# Patient Record
Sex: FEMALE | Race: WHITE | Employment: FULL TIME | URBAN - METROPOLITAN AREA
[De-identification: names, ages, dates, MRNs, and addresses within clinical notes are randomized per-mention and may not be internally consistent; named-entity substitution may affect disease eponyms.]

---

## 2018-04-11 LAB
ANTIBODY SCREEN, EXTERNAL: NEGATIVE
HBSAG, EXTERNAL: NEGATIVE
HIV, EXTERNAL: NEGATIVE
RPR, EXTERNAL: NORMAL
RUBELLA, EXTERNAL: NORMAL
TYPE, ABO & RH, EXTERNAL: NORMAL

## 2018-10-26 LAB — GRBS, EXTERNAL: NEGATIVE

## 2018-11-15 ENCOUNTER — HOSPITAL ENCOUNTER (INPATIENT)
Age: 33
LOS: 4 days | Discharge: HOME | End: 2018-11-19
Attending: OBSTETRICS & GYNECOLOGY | Admitting: OBSTETRICS & GYNECOLOGY
Payer: COMMERCIAL

## 2018-11-15 PROBLEM — Z33.1 IUP (INTRAUTERINE PREGNANCY), INCIDENTAL: Status: ACTIVE | Noted: 2018-11-15

## 2018-11-15 PROBLEM — Z3A.38 38 WEEKS GESTATION OF PREGNANCY: Status: ACTIVE | Noted: 2018-11-15

## 2018-11-15 LAB
ABO + RH BLD: NORMAL
ALBUMIN SERPL-MCNC: 2.4 G/DL (ref 3.4–5)
ALBUMIN/GLOB SERPL: 0.5 {RATIO} (ref 0.8–1.7)
ALP SERPL-CCNC: 144 U/L (ref 45–117)
ALT SERPL-CCNC: 16 U/L (ref 13–56)
ANION GAP SERPL CALC-SCNC: 11 MMOL/L (ref 3–18)
AST SERPL-CCNC: 12 U/L (ref 15–37)
BASOPHILS # BLD: 0 K/UL (ref 0–0.1)
BASOPHILS NFR BLD: 0 % (ref 0–2)
BILIRUB SERPL-MCNC: 0.2 MG/DL (ref 0.2–1)
BLOOD GROUP ANTIBODIES SERPL: NORMAL
BUN SERPL-MCNC: 9 MG/DL (ref 7–18)
BUN/CREAT SERPL: 15
CALCIUM SERPL-MCNC: 8.6 MG/DL (ref 8.5–10.1)
CHLORIDE SERPL-SCNC: 105 MMOL/L (ref 100–108)
CO2 SERPL-SCNC: 21 MMOL/L (ref 21–32)
CREAT SERPL-MCNC: 0.62 MG/DL (ref 0.6–1.3)
DIFFERENTIAL METHOD BLD: ABNORMAL
EOSINOPHIL # BLD: 0.2 K/UL (ref 0–0.4)
EOSINOPHIL NFR BLD: 2 % (ref 0–5)
ERYTHROCYTE [DISTWIDTH] IN BLOOD BY AUTOMATED COUNT: 14.2 % (ref 11.6–14.5)
GLOBULIN SER CALC-MCNC: 4.4 G/DL (ref 2–4)
GLUCOSE SERPL-MCNC: 82 MG/DL (ref 74–99)
HCT VFR BLD AUTO: 36.7 % (ref 35–45)
HGB BLD-MCNC: 11.9 G/DL (ref 12–16)
LDH SERPL L TO P-CCNC: 143 U/L (ref 81–234)
LYMPHOCYTES # BLD: 1.2 K/UL (ref 0.9–3.6)
LYMPHOCYTES NFR BLD: 12 % (ref 21–52)
MCH RBC QN AUTO: 29.2 PG (ref 24–34)
MCHC RBC AUTO-ENTMCNC: 32.4 G/DL (ref 31–37)
MCV RBC AUTO: 90 FL (ref 74–97)
MONOCYTES # BLD: 0.5 K/UL (ref 0.05–1.2)
MONOCYTES NFR BLD: 5 % (ref 3–10)
NEUTS SEG # BLD: 8.8 K/UL (ref 1.8–8)
NEUTS SEG NFR BLD: 81 % (ref 40–73)
PLATELET # BLD AUTO: 261 K/UL (ref 135–420)
PMV BLD AUTO: 9.7 FL (ref 9.2–11.8)
POTASSIUM SERPL-SCNC: 4 MMOL/L (ref 3.5–5.5)
PROT SERPL-MCNC: 6.8 G/DL (ref 6.4–8.2)
RBC # BLD AUTO: 4.08 M/UL (ref 4.2–5.3)
SODIUM SERPL-SCNC: 137 MMOL/L (ref 136–145)
SPECIMEN EXP DATE BLD: NORMAL
URATE SERPL-MCNC: 4.7 MG/DL (ref 2.6–7.2)
WBC # BLD AUTO: 10.7 K/UL (ref 4.6–13.2)

## 2018-11-15 PROCEDURE — 86901 BLOOD TYPING SEROLOGIC RH(D): CPT

## 2018-11-15 PROCEDURE — 84550 ASSAY OF BLOOD/URIC ACID: CPT

## 2018-11-15 PROCEDURE — 74011250636 HC RX REV CODE- 250/636: Performed by: OBSTETRICS & GYNECOLOGY

## 2018-11-15 PROCEDURE — 85025 COMPLETE CBC W/AUTO DIFF WBC: CPT

## 2018-11-15 PROCEDURE — 74011250637 HC RX REV CODE- 250/637: Performed by: OBSTETRICS & GYNECOLOGY

## 2018-11-15 PROCEDURE — 83615 LACTATE (LD) (LDH) ENZYME: CPT

## 2018-11-15 PROCEDURE — 80053 COMPREHEN METABOLIC PANEL: CPT

## 2018-11-15 PROCEDURE — 75410000002 HC LABOR FEE PER 1 HR

## 2018-11-15 PROCEDURE — 65270000029 HC RM PRIVATE

## 2018-11-15 RX ORDER — OXYTOCIN/0.9 % SODIUM CHLORIDE 30/500 ML
2-20 PLASTIC BAG, INJECTION (ML) INTRAVENOUS
Status: DISCONTINUED | OUTPATIENT
Start: 2018-11-16 | End: 2018-11-19 | Stop reason: HOSPADM

## 2018-11-15 RX ORDER — LEVOTHYROXINE SODIUM 125 UG/1
125 TABLET ORAL
COMMUNITY

## 2018-11-15 RX ORDER — OXYTOCIN/RINGER'S LACTATE 20/1000 ML
999 PLASTIC BAG, INJECTION (ML) INTRAVENOUS ONCE
Status: ACTIVE | OUTPATIENT
Start: 2018-11-15 | End: 2018-11-16

## 2018-11-15 RX ORDER — OXYTOCIN/0.9 % SODIUM CHLORIDE 30/500 ML
0-25 PLASTIC BAG, INJECTION (ML) INTRAVENOUS
Status: DISCONTINUED | OUTPATIENT
Start: 2018-11-15 | End: 2018-11-19 | Stop reason: HOSPADM

## 2018-11-15 RX ORDER — BUTORPHANOL TARTRATE 1 MG/ML
2 INJECTION INTRAMUSCULAR; INTRAVENOUS
Status: DISCONTINUED | OUTPATIENT
Start: 2018-11-15 | End: 2018-11-16 | Stop reason: HOSPADM

## 2018-11-15 RX ORDER — METHYLERGONOVINE MALEATE 0.2 MG/ML
0.2 INJECTION INTRAVENOUS AS NEEDED
Status: DISCONTINUED | OUTPATIENT
Start: 2018-11-15 | End: 2018-11-16 | Stop reason: HOSPADM

## 2018-11-15 RX ORDER — HYDROMORPHONE HYDROCHLORIDE 2 MG/ML
1 INJECTION, SOLUTION INTRAMUSCULAR; INTRAVENOUS; SUBCUTANEOUS
Status: DISCONTINUED | OUTPATIENT
Start: 2018-11-15 | End: 2018-11-16 | Stop reason: HOSPADM

## 2018-11-15 RX ORDER — ACETAMINOPHEN 500 MG
1000 TABLET ORAL
Status: DISCONTINUED | OUTPATIENT
Start: 2018-11-15 | End: 2018-11-19 | Stop reason: HOSPADM

## 2018-11-15 RX ORDER — NALBUPHINE HYDROCHLORIDE 10 MG/ML
10 INJECTION, SOLUTION INTRAMUSCULAR; INTRAVENOUS; SUBCUTANEOUS
Status: DISCONTINUED | OUTPATIENT
Start: 2018-11-15 | End: 2018-11-16 | Stop reason: HOSPADM

## 2018-11-15 RX ORDER — LIDOCAINE HYDROCHLORIDE 10 MG/ML
20 INJECTION, SOLUTION EPIDURAL; INFILTRATION; INTRACAUDAL; PERINEURAL AS NEEDED
Status: DISCONTINUED | OUTPATIENT
Start: 2018-11-15 | End: 2018-11-16 | Stop reason: HOSPADM

## 2018-11-15 RX ORDER — MINERAL OIL
30 OIL (ML) ORAL AS NEEDED
Status: DISCONTINUED | OUTPATIENT
Start: 2018-11-15 | End: 2018-11-16 | Stop reason: HOSPADM

## 2018-11-15 RX ORDER — LANOLIN ALCOHOL/MO/W.PET/CERES
CREAM (GRAM) TOPICAL
COMMUNITY

## 2018-11-15 RX ORDER — OXYTOCIN/RINGER'S LACTATE 20/1000 ML
125 PLASTIC BAG, INJECTION (ML) INTRAVENOUS CONTINUOUS
Status: DISCONTINUED | OUTPATIENT
Start: 2018-11-15 | End: 2018-11-16 | Stop reason: HOSPADM

## 2018-11-15 RX ORDER — CHOLECALCIFEROL (VITAMIN D3) 125 MCG
CAPSULE ORAL
COMMUNITY

## 2018-11-15 RX ORDER — SODIUM CHLORIDE, SODIUM LACTATE, POTASSIUM CHLORIDE, CALCIUM CHLORIDE 600; 310; 30; 20 MG/100ML; MG/100ML; MG/100ML; MG/100ML
125 INJECTION, SOLUTION INTRAVENOUS CONTINUOUS
Status: DISCONTINUED | OUTPATIENT
Start: 2018-11-15 | End: 2018-11-16 | Stop reason: HOSPADM

## 2018-11-15 RX ORDER — TERBUTALINE SULFATE 1 MG/ML
0.25 INJECTION SUBCUTANEOUS
Status: DISCONTINUED | OUTPATIENT
Start: 2018-11-15 | End: 2018-11-16 | Stop reason: HOSPADM

## 2018-11-15 RX ADMIN — OXYTOCIN-SODIUM CHLORIDE 0.9% IV SOLN 30 UNIT/500ML 1 MILLI-UNITS/MIN: 30-0.9/5 SOLUTION at 21:55

## 2018-11-15 RX ADMIN — ACETAMINOPHEN 1000 MG: 500 TABLET ORAL at 22:17

## 2018-11-15 RX ADMIN — SODIUM CHLORIDE, SODIUM LACTATE, POTASSIUM CHLORIDE, AND CALCIUM CHLORIDE 125 ML/HR: 600; 310; 30; 20 INJECTION, SOLUTION INTRAVENOUS at 22:22

## 2018-11-15 RX ADMIN — SODIUM CHLORIDE, SODIUM LACTATE, POTASSIUM CHLORIDE, AND CALCIUM CHLORIDE 125 ML/HR: 600; 310; 30; 20 INJECTION, SOLUTION INTRAVENOUS at 14:45

## 2018-11-15 RX ADMIN — LEVOTHYROXINE SODIUM 125 MCG: 25 TABLET ORAL at 21:00

## 2018-11-15 NOTE — ROUTINE PROCESS
38+5 weeks gestation ambulated to unit from office for direct admission due to confirmed by CNM exam in office of SROM which patient reports occurred today at 1100. Patient taken to [facility] and oriented to room and call bell, provided with gown to change. Admission assessment completed, consent forms signed, admission questions completed. 1459:  SVE 1-2/70/-3. Patient provided with ice water per request, call bell within reach. 1615:  Dr. Jossie Morrow at bedside assessing patient, [address] labs, patient's blood pressures, and SVE by this reported to Dr. Jossie Morrow, Dr. Jossie Morrow discussed plan of care with patient. Plan of care to start low dose Pitocin over night, patient may eat light dinner, may begin increasing Pitocin over 6mUn/min starting tomorrow morning at 0500. Patient verbalized understanding and agreement. 1850:  Telephone report given to Dr. Ernie Daniel, including blood pressures, labs, history, PROM, mild contractions, order to start low dose Pitocin held due to decelerations, and requested Dr. Ernie Daniel reviewed strip. DR. Ernie Daniel reviewed strip. MD reviewed strip and order received to continue to hold Pitocin and hold patient's diet order for the next 1-2 hours and only allow patient to eat and start Pitocin if FHR tracing is reactive without decelerations during the next 1-2 hours. Order to collect POC urine dip stick. Plan of care discussed with patient and patient verbalized understanding and agreement. 1920:  SBAR shift change report given to KELLY Simmons RN (oncoming nurse) by Lizy Parisi RN 
 (offgoing nurse). Report included the following information SBAR, MAR and Recent Results. Alarm parameters reviewed and opportunities for questions provided.

## 2018-11-15 NOTE — H&P
Ostetrical History and Physical 
Subjective:  
 
Date of Admission: 11/15/2018 Patient is a 35 y.o.   female admitted with PROM at 45 w at 11am, with no labor. Gest diabetes by diet. Shadi Reed For Obstetric history, see tejas. 
 
For Review of Systems, see prenatal 
 
Past Medical History:  
Diagnosis Date  Abnormal Papanicolaou smear of cervix  Anemia  Asthma  Gestational diabetes   
 diet controlled No past surgical history on file. Prior to Admission medications Medication Sig Start Date End Date Taking? Authorizing Provider  
levothyroxine (SYNTHROID) 125 mcg tablet Take 125 mcg by mouth Daily (before breakfast). Yes Provider, Historical  
ferrous sulfate (IRON) 325 mg (65 mg iron) tablet Take  by mouth Daily (before breakfast). Yes Provider, Historical  
PNV66-Iron Fumarate-FA-DSS-DHA 26-1.2- mg cap Take  by mouth. Yes Provider, Historical  
cholecalciferol, vitamin D3, (VITAMIN D3) 2,000 unit tab Take  by mouth. Yes Provider, Historical  
 
Allergies Allergen Reactions  Pcn [Penicillins] Rash Social History Tobacco Use  Smoking status: Former Smoker  Smokeless tobacco: Never Used Substance Use Topics  Alcohol use: No  
  Frequency: Never No family history on file. Objective:  
 
Blood pressure (!) 145/94, pulse 91, temperature 97.5 °F (36.4 °C), resp. rate 18, height 5' 4\" (1.626 m), weight 93.4 kg (206 lb), not currently breastfeeding. Temp (24hrs), Av.5 °F (36.4 °C), Min:97.5 °F (36.4 °C), Max:97.5 °F (36.4 °C) No intake/output data recorded. No intake/output data recorded. HEENT: No pallor, no jaundice, Thyroid and throat normal 
RESPIRATORY: Clear to A & P 
CVS: pulse reg, HS normal 
ABDOMEN: Gravid. Vertex. EFW=8lb +-1lb. No abnormal tenderness. Pelvic: Cervix 2, Effaced: 70% Station:  -3 Data Review:  
Recent Results (from the past 24 hour(s)) CBC WITH AUTOMATED DIFF  
 Collection Time: 11/15/18  2:40 PM  
Result Value Ref Range WBC 10.7 4.6 - 13.2 K/uL  
 RBC 4.08 (L) 4.20 - 5.30 M/uL  
 HGB 11.9 (L) 12.0 - 16.0 g/dL HCT 36.7 35.0 - 45.0 % MCV 90.0 74.0 - 97.0 FL  
 MCH 29.2 24.0 - 34.0 PG  
 MCHC 32.4 31.0 - 37.0 g/dL  
 RDW 14.2 11.6 - 14.5 % PLATELET 870 367 - 797 K/uL MPV 9.7 9.2 - 11.8 FL  
 NEUTROPHILS 81 (H) 40 - 73 % LYMPHOCYTES 12 (L) 21 - 52 % MONOCYTES 5 3 - 10 % EOSINOPHILS 2 0 - 5 % BASOPHILS 0 0 - 2 %  
 ABS. NEUTROPHILS 8.8 (H) 1.8 - 8.0 K/UL  
 ABS. LYMPHOCYTES 1.2 0.9 - 3.6 K/UL  
 ABS. MONOCYTES 0.5 0.05 - 1.2 K/UL  
 ABS. EOSINOPHILS 0.2 0.0 - 0.4 K/UL  
 ABS. BASOPHILS 0.0 0.0 - 0.1 K/UL  
 DF AUTOMATED METABOLIC PANEL, COMPREHENSIVE Collection Time: 11/15/18  2:40 PM  
Result Value Ref Range Sodium 137 136 - 145 mmol/L Potassium 4.0 3.5 - 5.5 mmol/L Chloride 105 100 - 108 mmol/L  
 CO2 21 21 - 32 mmol/L Anion gap 11 3.0 - 18 mmol/L Glucose 82 74 - 99 mg/dL BUN 9 7.0 - 18 MG/DL Creatinine 0.62 0.6 - 1.3 MG/DL  
 BUN/Creatinine ratio 15 GFR est AA >60 >60 ml/min/1.73m2 GFR est non-AA >60 >60 ml/min/1.73m2 Calcium 8.6 8.5 - 10.1 MG/DL Bilirubin, total 0.2 0.2 - 1.0 MG/DL  
 ALT (SGPT) 16 13 - 56 U/L  
 AST (SGOT) 12 (L) 15 - 37 U/L Alk. phosphatase 144 (H) 45 - 117 U/L Protein, total 6.8 6.4 - 8.2 g/dL Albumin 2.4 (L) 3.4 - 5.0 g/dL Globulin 4.4 (H) 2.0 - 4.0 g/dL A-G Ratio 0.5 (L) 0.8 - 1.7 LD Collection Time: 11/15/18  2:40 PM  
Result Value Ref Range  81 - 234 U/L  
URIC ACID Collection Time: 11/15/18  2:40 PM  
Result Value Ref Range Uric acid 4.7 2.6 - 7.2 MG/DL Monitor:  Reactivity:present Variability:present Baseline:within normal limits Assessment:  
 
Active Problems: 
  IUP (intrauterine pregnancy), incidental (11/15/2018) 38 weeks gestation of pregnancy (11/15/2018) Premature rupture of membranes (PROM), onset of labor within 24 hours, antepartum, , third trimester (11/15/2018) Gestational diabetes mellitus (GDM) in childbirth, diet controlled (11/15/2018) Plan: Low dose pit tonight Check labs:  CBC Check  Prenatal: 
 
Disposition:  
 
Type of admit:In-Patient I saw and examined patient. Signed By: David Grullon MD  
                      November 15, 2018

## 2018-11-16 ENCOUNTER — ANESTHESIA (OUTPATIENT)
Dept: LABOR AND DELIVERY | Age: 33
End: 2018-11-16
Payer: COMMERCIAL

## 2018-11-16 ENCOUNTER — ANESTHESIA EVENT (OUTPATIENT)
Dept: LABOR AND DELIVERY | Age: 33
End: 2018-11-16
Payer: COMMERCIAL

## 2018-11-16 LAB — GLUCOSE BLD STRIP.AUTO-MCNC: 107 MG/DL (ref 70–110)

## 2018-11-16 PROCEDURE — 76060000078 HC EPIDURAL ANESTHESIA

## 2018-11-16 PROCEDURE — 77030032490 HC SLV COMPR SCD KNE COVD -B: Performed by: OBSTETRICS & GYNECOLOGY

## 2018-11-16 PROCEDURE — 77030032490 HC SLV COMPR SCD KNE COVD -B

## 2018-11-16 PROCEDURE — 65270000029 HC RM PRIVATE

## 2018-11-16 PROCEDURE — 77010026064 HC OXYGEN INFANT MED AIR MIN

## 2018-11-16 PROCEDURE — 74011250636 HC RX REV CODE- 250/636

## 2018-11-16 PROCEDURE — 88307 TISSUE EXAM BY PATHOLOGIST: CPT

## 2018-11-16 PROCEDURE — 74011250636 HC RX REV CODE- 250/636: Performed by: OBSTETRICS & GYNECOLOGY

## 2018-11-16 PROCEDURE — 82962 GLUCOSE BLOOD TEST: CPT

## 2018-11-16 PROCEDURE — 77030034849

## 2018-11-16 PROCEDURE — 75410000002 HC LABOR FEE PER 1 HR

## 2018-11-16 PROCEDURE — 77030010848 HC CATH INTUTR PRSS KOLB -B

## 2018-11-16 PROCEDURE — 76010000391 HC C SECN FIRST 1 HR: Performed by: OBSTETRICS & GYNECOLOGY

## 2018-11-16 PROCEDURE — 75410000003 HC RECOV DEL/VAG/CSECN EA 0.5 HR: Performed by: OBSTETRICS & GYNECOLOGY

## 2018-11-16 PROCEDURE — 77030007879 HC KT SPN EPDRL TELE -B: Performed by: ANESTHESIOLOGY

## 2018-11-16 PROCEDURE — 77030008462 HC STPLR SKN PROX J&J -A: Performed by: OBSTETRICS & GYNECOLOGY

## 2018-11-16 PROCEDURE — 81003 URINALYSIS AUTO W/O SCOPE: CPT

## 2018-11-16 PROCEDURE — 77030031139 HC SUT VCRL2 J&J -A: Performed by: OBSTETRICS & GYNECOLOGY

## 2018-11-16 PROCEDURE — 76060000032 HC ANESTHESIA 0.5 TO 1 HR: Performed by: OBSTETRICS & GYNECOLOGY

## 2018-11-16 PROCEDURE — 75410000003 HC RECOV DEL/VAG/CSECN EA 0.5 HR

## 2018-11-16 PROCEDURE — 74011250637 HC RX REV CODE- 250/637: Performed by: OBSTETRICS & GYNECOLOGY

## 2018-11-16 PROCEDURE — 77030011943

## 2018-11-16 PROCEDURE — 74011250636 HC RX REV CODE- 250/636: Performed by: ANESTHESIOLOGY

## 2018-11-16 RX ORDER — CEFAZOLIN SODIUM/WATER 2 G/20 ML
SYRINGE (ML) INTRAVENOUS
Status: DISPENSED
Start: 2018-11-16 | End: 2018-11-17

## 2018-11-16 RX ORDER — SODIUM CHLORIDE 0.9 % (FLUSH) 0.9 %
5-10 SYRINGE (ML) INJECTION AS NEEDED
Status: DISCONTINUED | OUTPATIENT
Start: 2018-11-16 | End: 2018-11-19 | Stop reason: HOSPADM

## 2018-11-16 RX ORDER — CEFOXITIN SODIUM 2 G/50ML
2 INJECTION, SOLUTION INTRAVENOUS
Status: COMPLETED | OUTPATIENT
Start: 2018-11-16 | End: 2018-11-16

## 2018-11-16 RX ORDER — SODIUM CHLORIDE 0.9 % (FLUSH) 0.9 %
5-10 SYRINGE (ML) INJECTION EVERY 8 HOURS
Status: DISCONTINUED | OUTPATIENT
Start: 2018-11-16 | End: 2018-11-18

## 2018-11-16 RX ORDER — PHENYLEPHRINE HCL IN 0.9% NACL 1 MG/10 ML
80 SYRINGE (ML) INTRAVENOUS AS NEEDED
Status: DISCONTINUED | OUTPATIENT
Start: 2018-11-16 | End: 2018-11-16 | Stop reason: HOSPADM

## 2018-11-16 RX ORDER — FENTANYL CITRATE 50 UG/ML
100 INJECTION, SOLUTION INTRAMUSCULAR; INTRAVENOUS ONCE
Status: DISCONTINUED | OUTPATIENT
Start: 2018-11-16 | End: 2018-11-16 | Stop reason: HOSPADM

## 2018-11-16 RX ORDER — BUPIVACAINE HYDROCHLORIDE 2.5 MG/ML
INJECTION, SOLUTION EPIDURAL; INFILTRATION; INTRACAUDAL AS NEEDED
Status: DISCONTINUED | OUTPATIENT
Start: 2018-11-16 | End: 2018-11-16 | Stop reason: HOSPADM

## 2018-11-16 RX ORDER — LIDOCAINE HYDROCHLORIDE 10 MG/ML
INJECTION INFILTRATION; PERINEURAL AS NEEDED
Status: DISCONTINUED | OUTPATIENT
Start: 2018-11-16 | End: 2018-11-16 | Stop reason: HOSPADM

## 2018-11-16 RX ORDER — SODIUM CHLORIDE 0.9 % (FLUSH) 0.9 %
5-10 SYRINGE (ML) INJECTION AS NEEDED
Status: DISCONTINUED | OUTPATIENT
Start: 2018-11-16 | End: 2018-11-16 | Stop reason: HOSPADM

## 2018-11-16 RX ORDER — FENTANYL/ROPIVACAINE/NS/PF 2MCG/ML-.1
PLASTIC BAG, INJECTION (ML) EPIDURAL
Status: COMPLETED
Start: 2018-11-16 | End: 2018-11-16

## 2018-11-16 RX ORDER — LIDOCAINE HYDROCHLORIDE AND EPINEPHRINE 15; 5 MG/ML; UG/ML
INJECTION, SOLUTION EPIDURAL AS NEEDED
Status: DISCONTINUED | OUTPATIENT
Start: 2018-11-16 | End: 2018-11-16 | Stop reason: HOSPADM

## 2018-11-16 RX ORDER — SODIUM CHLORIDE, SODIUM LACTATE, POTASSIUM CHLORIDE, CALCIUM CHLORIDE 600; 310; 30; 20 MG/100ML; MG/100ML; MG/100ML; MG/100ML
125 INJECTION, SOLUTION INTRAVENOUS CONTINUOUS
Status: DISPENSED | OUTPATIENT
Start: 2018-11-16 | End: 2018-11-17

## 2018-11-16 RX ORDER — PROMETHAZINE HYDROCHLORIDE 25 MG/ML
25 INJECTION, SOLUTION INTRAMUSCULAR; INTRAVENOUS
Status: DISCONTINUED | OUTPATIENT
Start: 2018-11-16 | End: 2018-11-19 | Stop reason: HOSPADM

## 2018-11-16 RX ORDER — OXYCODONE AND ACETAMINOPHEN 5; 325 MG/1; MG/1
1-2 TABLET ORAL
Status: DISCONTINUED | OUTPATIENT
Start: 2018-11-16 | End: 2018-11-19 | Stop reason: HOSPADM

## 2018-11-16 RX ORDER — FENTANYL CITRATE 50 UG/ML
INJECTION, SOLUTION INTRAMUSCULAR; INTRAVENOUS AS NEEDED
Status: DISCONTINUED | OUTPATIENT
Start: 2018-11-16 | End: 2018-11-16 | Stop reason: HOSPADM

## 2018-11-16 RX ORDER — IBUPROFEN 400 MG/1
800 TABLET ORAL
Status: DISCONTINUED | OUTPATIENT
Start: 2018-11-19 | End: 2018-11-19 | Stop reason: HOSPADM

## 2018-11-16 RX ORDER — OXYTOCIN/RINGER'S LACTATE 20/1000 ML
125 PLASTIC BAG, INJECTION (ML) INTRAVENOUS CONTINUOUS
Status: DISCONTINUED | OUTPATIENT
Start: 2018-11-16 | End: 2018-11-19 | Stop reason: HOSPADM

## 2018-11-16 RX ORDER — NALOXONE HYDROCHLORIDE 0.4 MG/ML
0.2 INJECTION, SOLUTION INTRAMUSCULAR; INTRAVENOUS; SUBCUTANEOUS AS NEEDED
Status: DISCONTINUED | OUTPATIENT
Start: 2018-11-16 | End: 2018-11-16 | Stop reason: HOSPADM

## 2018-11-16 RX ORDER — ACETAMINOPHEN 325 MG/1
650 TABLET ORAL
Status: DISCONTINUED | OUTPATIENT
Start: 2018-11-16 | End: 2018-11-19 | Stop reason: HOSPADM

## 2018-11-16 RX ORDER — CARBOPROST TROMETHAMINE 250 UG/ML
INJECTION, SOLUTION INTRAMUSCULAR
Status: DISPENSED
Start: 2018-11-16 | End: 2018-11-17

## 2018-11-16 RX ORDER — KETOROLAC TROMETHAMINE 30 MG/ML
30 INJECTION, SOLUTION INTRAMUSCULAR; INTRAVENOUS EVERY 6 HOURS
Status: DISPENSED | OUTPATIENT
Start: 2018-11-16 | End: 2018-11-18

## 2018-11-16 RX ORDER — KETOROLAC TROMETHAMINE 30 MG/ML
30 INJECTION, SOLUTION INTRAMUSCULAR; INTRAVENOUS EVERY 6 HOURS
Status: DISCONTINUED | OUTPATIENT
Start: 2018-11-16 | End: 2018-11-16

## 2018-11-16 RX ORDER — SIMETHICONE 80 MG
80 TABLET,CHEWABLE ORAL
Status: DISCONTINUED | OUTPATIENT
Start: 2018-11-16 | End: 2018-11-19 | Stop reason: HOSPADM

## 2018-11-16 RX ORDER — ONDANSETRON 2 MG/ML
INJECTION INTRAMUSCULAR; INTRAVENOUS AS NEEDED
Status: DISCONTINUED | OUTPATIENT
Start: 2018-11-16 | End: 2018-11-16 | Stop reason: HOSPADM

## 2018-11-16 RX ORDER — FENTANYL/ROPIVACAINE/NS/PF 2MCG/ML-.1
1-15 PLASTIC BAG, INJECTION (ML) EPIDURAL
Status: DISCONTINUED | OUTPATIENT
Start: 2018-11-16 | End: 2018-11-16 | Stop reason: HOSPADM

## 2018-11-16 RX ORDER — FENTANYL CITRATE 50 UG/ML
INJECTION, SOLUTION INTRAMUSCULAR; INTRAVENOUS
Status: COMPLETED
Start: 2018-11-16 | End: 2018-11-16

## 2018-11-16 RX ORDER — ZOLPIDEM TARTRATE 5 MG/1
5 TABLET ORAL
Status: DISCONTINUED | OUTPATIENT
Start: 2018-11-16 | End: 2018-11-19 | Stop reason: HOSPADM

## 2018-11-16 RX ORDER — FACIAL-BODY WIPES
10 EACH TOPICAL DAILY PRN
Status: DISCONTINUED | OUTPATIENT
Start: 2018-11-16 | End: 2018-11-19 | Stop reason: HOSPADM

## 2018-11-16 RX ORDER — MISOPROSTOL 100 UG/1
TABLET ORAL
Status: DISPENSED
Start: 2018-11-16 | End: 2018-11-17

## 2018-11-16 RX ORDER — OXYTOCIN/RINGER'S LACTATE 20/1000 ML
PLASTIC BAG, INJECTION (ML) INTRAVENOUS
Status: DISCONTINUED | OUTPATIENT
Start: 2018-11-16 | End: 2018-11-16 | Stop reason: HOSPADM

## 2018-11-16 RX ORDER — KETOROLAC TROMETHAMINE 30 MG/ML
INJECTION, SOLUTION INTRAMUSCULAR; INTRAVENOUS
Status: COMPLETED
Start: 2018-11-16 | End: 2018-11-16

## 2018-11-16 RX ORDER — SODIUM CHLORIDE 0.9 % (FLUSH) 0.9 %
5-10 SYRINGE (ML) INJECTION EVERY 8 HOURS
Status: DISCONTINUED | OUTPATIENT
Start: 2018-11-16 | End: 2018-11-16 | Stop reason: HOSPADM

## 2018-11-16 RX ADMIN — Medication: at 13:48

## 2018-11-16 RX ADMIN — SODIUM CHLORIDE, SODIUM LACTATE, POTASSIUM CHLORIDE, AND CALCIUM CHLORIDE 125 ML/HR: 600; 310; 30; 20 INJECTION, SOLUTION INTRAVENOUS at 23:47

## 2018-11-16 RX ADMIN — OXYCODONE HYDROCHLORIDE AND ACETAMINOPHEN 2 TABLET: 5; 325 TABLET ORAL at 23:47

## 2018-11-16 RX ADMIN — ONDANSETRON 4 MG: 2 INJECTION INTRAMUSCULAR; INTRAVENOUS at 14:03

## 2018-11-16 RX ADMIN — BUPIVACAINE HYDROCHLORIDE 8 ML: 2.5 INJECTION, SOLUTION EPIDURAL; INFILTRATION; INTRACAUDAL at 09:11

## 2018-11-16 RX ADMIN — FENTANYL CITRATE 100 MCG: 50 INJECTION, SOLUTION INTRAMUSCULAR; INTRAVENOUS at 09:15

## 2018-11-16 RX ADMIN — SODIUM CHLORIDE, SODIUM LACTATE, POTASSIUM CHLORIDE, AND CALCIUM CHLORIDE 125 ML/HR: 600; 310; 30; 20 INJECTION, SOLUTION INTRAVENOUS at 13:06

## 2018-11-16 RX ADMIN — ROPIVACAINE HYDROCHLORIDE 10 ML/HR: 10 INJECTION, SOLUTION EPIDURAL at 09:16

## 2018-11-16 RX ADMIN — LEVOTHYROXINE SODIUM 125 MCG: 25 TABLET ORAL at 22:28

## 2018-11-16 RX ADMIN — OXYTOCIN-SODIUM CHLORIDE 0.9% IV SOLN 30 UNIT/500ML 4 MILLI-UNITS/MIN: 30-0.9/5 SOLUTION at 13:19

## 2018-11-16 RX ADMIN — CEFOXITIN SODIUM 2 G: 2 INJECTION, SOLUTION INTRAVENOUS at 09:56

## 2018-11-16 RX ADMIN — SODIUM CHLORIDE, SODIUM LACTATE, POTASSIUM CHLORIDE, AND CALCIUM CHLORIDE 1000 ML: 600; 310; 30; 20 INJECTION, SOLUTION INTRAVENOUS at 09:00

## 2018-11-16 RX ADMIN — KETOROLAC TROMETHAMINE 30 MG: 30 INJECTION, SOLUTION INTRAMUSCULAR at 16:33

## 2018-11-16 RX ADMIN — Medication 125 ML/HR: at 16:40

## 2018-11-16 RX ADMIN — LIDOCAINE HYDROCHLORIDE AND EPINEPHRINE 3 ML: 15; 5 INJECTION, SOLUTION EPIDURAL at 09:14

## 2018-11-16 RX ADMIN — LIDOCAINE HYDROCHLORIDE 3 ML: 10 INJECTION INFILTRATION; PERINEURAL at 09:09

## 2018-11-16 RX ADMIN — Medication 10 ML/HR: at 09:16

## 2018-11-16 RX ADMIN — ACETAMINOPHEN 650 MG: 325 TABLET ORAL at 18:58

## 2018-11-16 RX ADMIN — CEFOXITIN SODIUM 2 G: 2 INJECTION, SOLUTION INTRAVENOUS at 13:39

## 2018-11-16 NOTE — ANESTHESIA POSTPROCEDURE EVALUATION
Post-Anesthesia Evaluation and Assessment Cardiovascular Function/Vital Signs Visit Vitals /62 Pulse 99 Temp 36.9 °C (98.4 °F) Resp 16 Ht 5' 4\" (1.626 m) Wt 93.4 kg (206 lb) SpO2 100% Breastfeeding? Unknown BMI 35.36 kg/m² Patient is status post Procedure(s):  SECTION. Nausea/Vomiting: Controlled. Postoperative hydration reviewed and adequate. Pain: 
Pain Scale 1: Numeric (0 - 10) (18 1515) Pain Intensity 1: 0 (18 151) Managed. Neurological Status:  
Neuro (WDL): Within Defined Limits (18 1458) At baseline. Mental Status and Level of Consciousness: Arousable. Pulmonary Status:  
O2 Device: Room air (18 1458) Adequate oxygenation and airway patent. Complications related to anesthesia: None Post-anesthesia assessment completed. No concerns. Patient has met all discharge requirements. Signed By: Coco Macias CRNA 2018 Procedure(s):  SECTION. Anesthesia Post Evaluation Comments: Post-Anesthesia Evaluation and Assessment Cardiovascular Function/Vital Signs /45 (BP 1 Location: Right arm, BP Patient Position: At rest)   Pulse 97   Temp 37.6 °C (99.7 °F)   Resp 16   Ht 5' 4\" (1.626 m)   Wt 93.4 kg (206 lb)   SpO2 98%   Breastfeeding? Unknown   BMI 35.36 kg/m² Patient is status post Procedure(s):  SECTION. Nausea/Vomiting: Controlled. Postoperative hydration reviewed and adequate. Pain: 
Pain Scale 1: Numeric (0 - 10) (18 1700) Pain Intensity 1: 4 (18 1700) Managed. Neurological Status:  
Neuro (WDL): Within Defined Limits (18 1458) At baseline. Mental Status and Level of Consciousness: Arousable. Pulmonary Status:  
O2 Device: Room air (18 1700) Adequate oxygenation and airway patent. Complications related to anesthesia: None Post-anesthesia assessment completed. No concerns. Patient has met all discharge requirements. Signed By: Fatuma Lane MD  
 November 16, 2018 Visit Vitals /62 Pulse 99 Temp 36.9 °C (98.4 °F) Resp 16 Ht 5' 4\" (1.626 m) Wt 93.4 kg (206 lb) SpO2 100% Breastfeeding? Unknown BMI 35.36 kg/m²

## 2018-11-16 NOTE — PROGRESS NOTES
FHTs better after prolonged episode knee chest. Cervix same, lip only. N ow no cervical change for 2.5hours. Will try restart pit once more very slowly.  
 
Dt.

## 2018-11-16 NOTE — PROGRESS NOTES
1920:  Assumed care of pt from hospitals. Pt in room with family. Pt having decels at this time, Dr Anny Sosa and pt expressed understanding not to have anything to eat tat this time. Plan of Care discussed and Questions answered 2138:  SVE 1/70/-3. Will start pit at 1 
 
2157: Pt started at 1. Pt c/o headache  
 
2204:  Jarome May Paged. Order to keep pitocin at 1 Ok to give tylenol 1000 Q8 PRN  
 
0408:  Carlos Zamora noted. Pt turned to left side, bolus started 2775:  Decel noted. Bolus started pt turned to right side 9799:  Prolonged decels noted. Pit turned off

## 2018-11-16 NOTE — PROGRESS NOTES
Bedside and Verbal shift change report given to CARRIE Yo RN (oncoming nurse) by MIKE Sawyer RN (offgoing nurse). Report included the following information SBAR, Kardex, Procedure Summary, Intake/Output, MAR and Recent Results. 0815 5 minute deceleration noted with great variability. Dr. Maine Renae called at work and informed him of Pitocin at 6 lisandro-units, 5 minute decelerations. Orders received to go back to 4 lisandro-units. Encourage pt to get epidural for pain rather than stadol. Pitocin decreased to 4 lisandro-units. 7696 Dr. Feliciano Maier at bedside for epidural placement. Procedure explained by Dr. Feliciano Maier. Consent signed  
 
8242 Time Out completed 
 
0911 Bolus administered 
 
0913 Epidural Cath placed and procedure complete, pt tolerated procedure well. 
0913 Test dose administered by Anesthesiologist  
  
0914 Fentanyl handed to Dr. Feliciano Maier  and PCEA started, settings read to Dr. Feliciano Maier, and pain management explained to patient and family. Pt in semi-joy's position 0930 Fetal HR decreased to 90. Pt turned to right lateral side. Fluid bolus administered. PT assisted to left lateral side. Infant continues to be down. SVE 7-8/90/-2 
 
0936 Trial of hands and knees. Fi 
0937 HR going back up 0939 HR going down difficulty tracing HR. Pt returned to semi joy's position. FSE placed. Nurse instructed to call Dr. Maine Renae to make him aware. 9038 Test dose administered by Anesthesiologist  
 
0914 Fentanyl handed to Dr. Feliciano Maier  and PCEA started, settings read to Dr. Feliciano Maier, and pain management explained to patient and family. Pt in semi-joy's position 0930 Decreased FHR. Pt turned to left right lateral side. 6510 Fluid bolus administered 2431 PT assisted to right lateral side. 0934 O2 administered and Pitocin turned off.   
 
0936 SVE 7-8/90/-2 Scalp stimulation performed. FHR returned to 160 but decreasing when scalp stimulation stopped. Pt assisted to hands and knees. Trial of FSE placement but difficult in this position. RN  instructed to call Dr. Yolanda Sheppard. 1907 Difficulty tracing HR. PT returned in semifowler's position. FSE placed. 0947 FHR back to baseline. Dr. Yolanda Sheppard ordered start of Mefoxin. 56 Dr. Yolanda Sheppard at bedside. SVE 9/100/-1. Continue with plan for vaginal delivery 1050 Dr. Yolanda Sheppard at bedside. SVE unchanged. IUPC placed. ORders received to start amnioinfusion. 1115 Amnioinfusion started [address] [facility] [address]. Yolanda Sheppard on unit. FHR strip reviewed. Orders received to restart Pitocin. [address] Dr. Yolanda Sheppard at bedside. Decreased FHR. Prolonged deceleration. Pt turned on left lateral side, fluid bolus started. Pitocin stopped. 1220 PT back on right lateral side. SVE unchanged. Deceleration continued. 1224 PT in hands and knees. FHR back to baseline. Will monitor pt for 30 minutes in this position if no further change Dr. Arias Learn informed pt will do a . 1250 DR. Yolanda Sheppard at bedside. Pt returned on right lateral side. SVE unchanged but FHR stable. Pitocin restarted. 1320 Dr. Yolanda Sheppard at bedside. Orders received to go to 4 lisandro-units on the Pitocin. Immediate prolonged deceleration 1325 C-section called. 1328 In OR 
 
1348 Delivery of viable male infant. 1420 Return to [facility] room. [phone] paged for sign out] 1533 Sign out received. [address] and clean pads provided. 1710 TRANSFER - OUT REPORT: 
 
Verbal report given to VANESA Cassidy RN(name) on   being transferred to postpartum(unit) for routine progression of care Report consisted of patients Situation, Background, Assessment and  
Recommendations(SBAR). Information from the following report(s) SBAR, Kardex, Procedure Summary, Intake/Output, MAR and Recent Results was reviewed with the receiving nurse. Opportunity for questions and clarification was provided. Patient transported with: 
 Registered Nurse

## 2018-11-16 NOTE — PROGRESS NOTES
8130 Phone call to Dr. Justina Calle. Updated on patient status, FHT, contraction pattern and SVE. Verbal orders to give 2g of Mefoxin, turn off pitocin, and keep patient on side.

## 2018-11-16 NOTE — PROGRESS NOTES
2 decels overnight, 1 at 230 and 1 at 410. Otherwise FHTs have been excellent, with great variability and great reactivity. Pit turned off by Dr Se Kamara, restarted now. Monitor:  Reactivity:present Variability:present Baseline:within normal limits  Contractions q 5 Vitals:  Blood pressure 127/76, pulse (!) 113, temperature 97.8 °F (36.6 °C), resp. rate 18, height 5' 4\" (1.626 m), weight 93.4 kg (206 lb), not currently breastfeeding. Pelvic exam:  Cervix 3 Effaced: 50%Station: -2 Plan:  
 
Restart pit. Signed By: Ronda Castaneda MD  
                      November 16, 2018

## 2018-11-16 NOTE — ROUTINE PROCESS
Bedside and Verbal shift change report given to [address], RN (oncoming nurse) by Ambrose Reich RN (offgoing nurse). Report included the following information SBAR, Kardex, Intake/Output, MAR, Recent Results.

## 2018-11-16 NOTE — ANESTHESIA PREPROCEDURE EVALUATION
Anesthetic History No history of anesthetic complications Review of Systems / Medical History Patient summary reviewed, nursing notes reviewed and pertinent labs reviewed Pulmonary Asthma : well controlled Neuro/Psych Within defined limits Cardiovascular Within defined limits Exercise tolerance: >4 METS 
  
GI/Hepatic/Renal 
  
 
 
 
 
 
 Endo/Other Diabetes (gestational) Other Findings Physical Exam 
 
Airway Mallampati: II 
TM Distance: 4 - 6 cm Neck ROM: normal range of motion Mouth opening: Normal 
 
 Cardiovascular Dental 
 
Dentition: Loose teeth and Poor dentition Pulmonary Abdominal 
GI exam deferred Other Findings Anesthetic Plan ASA: 2 Anesthesia type: epidural 
 
 
 
 
 
Anesthetic plan and risks discussed with: Patient

## 2018-11-16 NOTE — PROGRESS NOTES
Patient seen Recurrent decels, always with good recovery, and good FHTs in between. Abx almost in Cervix lip on R and anterior Continue current treatment. Labor is adequate without pit Considered IUPC and amnio infusion, but will hold as she is lip only.  
 
dt

## 2018-11-16 NOTE — ANESTHESIA PROCEDURE NOTES
Epidural Block Start time: 11/16/2018 9:08 AM 
End time: 11/16/2018 9:18 AM 
Performed by: Tamy Wolfe MD 
Authorized by: Tamy Wolfe MD  
 
Pre-Procedure Indication: labor epidural   
Preanesthetic Checklist: patient identified, risks and benefits discussed, anesthesia consent, patient being monitored, timeout performed and anesthesia consent Epidural:  
Patient position:  Seated Prep region:  Lumbar Prep: Chlorhexidine Location:  L3-4 Needle and Epidural Catheter:  
Needle Type:  Tuohy Needle Gauge:  17 G Injection Technique:  Loss of resistance using air Attempts:  1 Catheter Size:  20 G Catheter at Skin Depth (cm):  11 Events: no blood with aspiration, no cerebrospinal fluid with aspiration, no paresthesia and negative aspiration test   
Test Dose:  Negative and lidocaine 1.5% w/ epi Assessment:  
Catheter Secured:  Tegaderm and tape Insertion:  Uncomplicated Patient tolerance:  Patient tolerated the procedure well with no immediate complications

## 2018-11-16 NOTE — PROGRESS NOTES
1700 TRANSFER - IN REPORT: 
 
Verbal report received from CARRIE Henson RN(name) on April Eastwood  being received from L&D(unit) for routine progression of care Report consisted of patients Situation, Background, Assessment and  
Recommendations(SBAR). Information from the following report(s) SBAR, Intake/Output, MAR and Recent Results was reviewed with the receiving nurse. Opportunity for questions and clarification was provided. Assessment completed upon patients arrival to unit and care assumed. Call bell placed within reach of patient. SCD's applied. IS explained and patient demonstrated. No further needs at this time 1800 pt in bed resting quietly, waiting on clear liquid tray. Denies any needs at this time. 1900 Bedside and Verbal shift change report given to [address] [facility] Miladis (oncoming nurse) by Ilya Coleman RN 
 (offgoing nurse). Report included the following information SBAR, Intake/Output, MAR and Recent Results.

## 2018-11-17 PROBLEM — O42.10 PROLONGED RUPTURE OF MEMBRANES, GREATER THAN 24 HOURS, DELIVERED, CURRENT HOSPITALIZATION: Status: ACTIVE | Noted: 2018-11-17

## 2018-11-17 PROBLEM — O36.8390 NON-REASSURING FETAL HEART RATE OR RHYTHM AFFECTING MANAGEMENT OF MOTHER: Status: ACTIVE | Noted: 2018-11-17

## 2018-11-17 LAB
HCT VFR BLD AUTO: 26.7 % (ref 35–45)
HGB BLD-MCNC: 8.8 G/DL (ref 12–16)

## 2018-11-17 PROCEDURE — 85014 HEMATOCRIT: CPT

## 2018-11-17 PROCEDURE — 36415 COLL VENOUS BLD VENIPUNCTURE: CPT

## 2018-11-17 PROCEDURE — 65270000029 HC RM PRIVATE

## 2018-11-17 PROCEDURE — 77030027138 HC INCENT SPIROMETER -A

## 2018-11-17 PROCEDURE — 74011250637 HC RX REV CODE- 250/637: Performed by: OBSTETRICS & GYNECOLOGY

## 2018-11-17 PROCEDURE — 74011250636 HC RX REV CODE- 250/636: Performed by: OBSTETRICS & GYNECOLOGY

## 2018-11-17 PROCEDURE — 85018 HEMOGLOBIN: CPT

## 2018-11-17 RX ORDER — LEVOTHYROXINE SODIUM 125 UG/1
125 TABLET ORAL
Status: DISCONTINUED | OUTPATIENT
Start: 2018-11-17 | End: 2018-11-19 | Stop reason: HOSPADM

## 2018-11-17 RX ADMIN — SODIUM CHLORIDE, SODIUM LACTATE, POTASSIUM CHLORIDE, AND CALCIUM CHLORIDE 125 ML/HR: 600; 310; 30; 20 INJECTION, SOLUTION INTRAVENOUS at 07:21

## 2018-11-17 RX ADMIN — KETOROLAC TROMETHAMINE 30 MG: 30 INJECTION, SOLUTION INTRAMUSCULAR at 04:34

## 2018-11-17 RX ADMIN — OXYCODONE HYDROCHLORIDE AND ACETAMINOPHEN 2 TABLET: 5; 325 TABLET ORAL at 09:39

## 2018-11-17 RX ADMIN — KETOROLAC TROMETHAMINE 30 MG: 30 INJECTION, SOLUTION INTRAMUSCULAR at 16:22

## 2018-11-17 RX ADMIN — LEVOTHYROXINE SODIUM 125 MCG: 125 TABLET ORAL at 22:32

## 2018-11-17 RX ADMIN — KETOROLAC TROMETHAMINE 30 MG: 30 INJECTION, SOLUTION INTRAMUSCULAR at 22:32

## 2018-11-17 RX ADMIN — OXYCODONE HYDROCHLORIDE AND ACETAMINOPHEN 2 TABLET: 5; 325 TABLET ORAL at 16:22

## 2018-11-17 RX ADMIN — KETOROLAC TROMETHAMINE 30 MG: 30 INJECTION, SOLUTION INTRAMUSCULAR at 10:39

## 2018-11-17 NOTE — PROGRESS NOTES
Problem: Falls - Risk of 
Goal: *Absence of Falls Document Mana Trejo Fall Risk and appropriate interventions in the flowsheet. Outcome: Resolved/Met Date Met: 11/17/18 Fall Risk Interventions: 
Mobility Interventions: Patient to call before getting OOB, Communicate number of staff needed for ambulation/transfer

## 2018-11-17 NOTE — PROGRESS NOTES
0700 Bedside and Verbal shift change report given to Guillermina Bunn RN 
 (oncoming nurse) by Doni Soriano RN (offgoing nurse). Report included the following information SBAR, Intake/Output, MAR and Recent Results. 0820 pt assessment completed (see flowsheet) Pt stated she was able to get \"a few hours of sleep overnight\". 0930 pt ambulated to bathroom w/out any dizziness or lightheadness, voided 500 ml pale yellow. Pt returned to bed, sitting on edge eating breakfast tray. Requesting pain meds. 0939 pain meds given (see MAR) 1730 fundal check completed. Pt taking a shower, removing dressing from incision. Reassessed incision site after removing dressing, approximated with no drainage 
 
1900 Bedside and Verbal shift change report given to JACK Jacobs RN (oncoming nurse) by Guillermina Bunn RN 
 (offgoing nurse). Report included the following information SBAR, Intake/Output, MAR and Recent Results.

## 2018-11-17 NOTE — PROGRESS NOTES
Progress Note Patient: Amilcar Monk MRN: [id number]  SSN: [id number] YOB: 1985  Age: 35 y.o. Sex: female ROOM:  240/01 Subjective:  
 
Postpartum Day: 1 Delivery:  delivery The patient feels well. The patient denies emotional concerns. The baby is well. Baby is feeding via breast.  The patient is ambulating well. The patient  tolerating a normal diet. Flatus has been passed. Objective:  
  
Patient Vitals for the past 24 hrs: 
 BP Temp Pulse Resp SpO2  
18 0755 112/67 98.1 °F (36.7 °C) 92 16 99 % 18 2330 124/73 98.4 °F (36.9 °C) 90 18 99 % 18 1930 118/70 98.2 °F (36.8 °C) 89 16 96 % 18 1700 118/45 99.7 °F (37.6 °C) 97 16 98 % 18 1630 111/70 98.4 °F (36.9 °C) 97 16   
18 1616 107/53  98    
18 1600 116/55  91  100 % 18 1555     100 % 18 1525     100 % 18 1520     100 % 18 1515 100/62 98.4 °F (36.9 °C) 99 16 100 % 18 1500 96/79  (!) 110    
18 1455  98.2 °F (36.8 °C) (!) 104  100 % 18 1450     100 % 18 1445 101/65  99  99 % 18 1442 92/58  (!) 111    
18 1440     99 % 18 1436 92/58 97.9 °F (36.6 °C) (!) 111 16   
18 1435     99 % 18 1433 104/58  (!) 105    
18 1430  97.9 °F (36.6 °C)   99 % 18 1428 (!) 82/60 97.9 °F (36.6 °C) (!) 155 18 99 % 18 1426 97/58  (!) 112    
18 1425 (!) 153/127  (!) 116  (!) 89 % 18 1423 (!) 150/137  (!) 110    
18 1421 (!) 134/105  83    
18 1420     97 % 18 1321     100 % 18 1316     100 % 18 1315 128/80  (!) 101    
18 1311     100 % 18 1306     99 % 18 1301   (!) 113  100 % 18 1300 149/78      
18 1256     100 % 18 1251     99 % 18 1246     100 % 11/16/18 1245 129/75 97.7 °F (36.5 °C) (!) 101 16   
11/16/18 1241     97 % 11/16/18 1236     96 % 11/16/18 1231   (!) 103  97 % 11/16/18 1230 116/50      
11/16/18 1226     99 % 11/16/18 1221     100 % 11/16/18 1216     100 % 11/16/18 1215 119/84  86    
11/16/18 1211     100 % 11/16/18 1206     100 % 11/16/18 1201   85  99 % 11/16/18 1200 116/70      
11/16/18 1156     100 % 11/16/18 1151     100 % 11/16/18 1150     100 % 11/16/18 1145 126/76  88  100 % 11/16/18 1140     100 % 11/16/18 1135     100 % 11/16/18 1130 121/79  96  100 % 11/16/18 1125     100 % 11/16/18 1120     100 % 11/16/18 1115 116/76  95  100 % 11/16/18 1110     100 % 11/16/18 1105     100 % 11/16/18 1100 117/68  (!) 101  100 % 11/16/18 1055     100 % 11/16/18 1050     100 % 11/16/18 1046 107/80 97.5 °F (36.4 °C) (!) 101 16   
11/16/18 1045     100 % 11/16/18 1040     100 % 11/16/18 1030 122/70  80    
11/16/18 1016 113/70  97    
11/16/18 1000 113/68  (!) 115    
11/16/18 0940     100 % 11/16/18 0938     100 % 11/16/18 0937     (!) 75 % 11/16/18 0931 136/81  (!) 116  100 % 11/16/18 0930 123/79  (!) 103    
11/16/18 0928     100 % 11/16/18 0927 122/75  99    
11/16/18 0926     100 % 11/16/18 0924 124/79  (!) 101    
11/16/18 0923 113/74  (!) 113    
11/16/18 0922 121/74  (!) 112  100 % 11/16/18 0921 147/79  99    
11/16/18 0914     99 % 11/16/18 0909     96 % Lochia:  appropriate Uterine Fundus:   firm Fundus Location:  -3 Incision:  no significant drainage, no dehiscence, no significant erythema DVT Evaluation:  No evidence of DVT seen on physical exam. 
Negative Shahram's sign. No cords or calf tenderness. No significant calf/ankle edema. Lab/Data Review: All lab results for the last 24 hours reviewed. LABS: Recent Results (from the past 48 hour(s)) CBC WITH AUTOMATED DIFF Collection Time: 11/15/18  2:40 PM  
Result Value Ref Range WBC 10.7 4.6 - 13.2 K/uL  
 RBC 4.08 (L) 4.20 - 5.30 M/uL  
 HGB 11.9 (L) 12.0 - 16.0 g/dL HCT 36.7 35.0 - 45.0 % MCV 90.0 74.0 - 97.0 FL  
 MCH 29.2 24.0 - 34.0 PG  
 MCHC 32.4 31.0 - 37.0 g/dL  
 RDW 14.2 11.6 - 14.5 % PLATELET 182 483 - 187 K/uL MPV 9.7 9.2 - 11.8 FL  
 NEUTROPHILS 81 (H) 40 - 73 % LYMPHOCYTES 12 (L) 21 - 52 % MONOCYTES 5 3 - 10 % EOSINOPHILS 2 0 - 5 % BASOPHILS 0 0 - 2 %  
 ABS. NEUTROPHILS 8.8 (H) 1.8 - 8.0 K/UL  
 ABS. LYMPHOCYTES 1.2 0.9 - 3.6 K/UL  
 ABS. MONOCYTES 0.5 0.05 - 1.2 K/UL  
 ABS. EOSINOPHILS 0.2 0.0 - 0.4 K/UL  
 ABS. BASOPHILS 0.0 0.0 - 0.1 K/UL  
 DF AUTOMATED    
TYPE & SCREEN Collection Time: 11/15/18  2:40 PM  
Result Value Ref Range Crossmatch Expiration 11/18/2018 ABO/Rh(D) O POSITIVE Antibody screen NEG METABOLIC PANEL, COMPREHENSIVE Collection Time: 11/15/18  2:40 PM  
Result Value Ref Range Sodium 137 136 - 145 mmol/L Potassium 4.0 3.5 - 5.5 mmol/L Chloride 105 100 - 108 mmol/L  
 CO2 21 21 - 32 mmol/L Anion gap 11 3.0 - 18 mmol/L Glucose 82 74 - 99 mg/dL BUN 9 7.0 - 18 MG/DL Creatinine 0.62 0.6 - 1.3 MG/DL  
 BUN/Creatinine ratio 15 GFR est AA >60 >60 ml/min/1.73m2 GFR est non-AA >60 >60 ml/min/1.73m2 Calcium 8.6 8.5 - 10.1 MG/DL Bilirubin, total 0.2 0.2 - 1.0 MG/DL  
 ALT (SGPT) 16 13 - 56 U/L  
 AST (SGOT) 12 (L) 15 - 37 U/L Alk. phosphatase 144 (H) 45 - 117 U/L Protein, total 6.8 6.4 - 8.2 g/dL Albumin 2.4 (L) 3.4 - 5.0 g/dL Globulin 4.4 (H) 2.0 - 4.0 g/dL A-G Ratio 0.5 (L) 0.8 - 1.7 LD Collection Time: 11/15/18  2:40 PM  
Result Value Ref Range  81 - 234 U/L  
URIC ACID Collection Time: 11/15/18  2:40 PM  
Result Value Ref Range Uric acid 4.7 2.6 - 7.2 MG/DL  
GLUCOSE, POC  Collection Time: 11/16/18  8:00 AM  
 Result Value Ref Range Glucose (POC) 107 70 - 110 mg/dL HEMOGLOBIN Collection Time: 18  3:08 AM  
Result Value Ref Range HGB 8.8 (L) 12.0 - 16.0 g/dL HEMATOCRIT Collection Time: 18  3:08 AM  
Result Value Ref Range HCT 26.7 (L) 35.0 - 45.0 % Assessment:  
 
Status post: Doing well postpartum  delivery Plan:  
 
Postpartum care discussed including diet, ambulation, and actvitiy restrictions. Discussed circumcision: Benefits are that it may be easier to keep clean and various ethnic and Taoism customs. Risks are bleeding most commonly, which is resolved with pressure or hemostatic gels or sponges. Scarring and infection are possible but extremely unlikely. It may not be be possible to remove all that we would like to remove if the shaft is short, as this would be more likely to lead to scarring. Wants circ done. Discharge instructions and questions answered. Signed By: Glen Wang MD   
 2018

## 2018-11-17 NOTE — PROGRESS NOTES
2018 10:42 AM 
 
Anesthesia [facility] Post-Op Rounding Note Referring physician: Rustam Persaud MD  
Patient status post Procedure(s):  SECTION on 2018 Visit Vitals /67 (BP 1 Location: Right arm, BP Patient Position: At rest) Pulse 92 Temp 36.7 °C (98.1 °F) Resp 16 Ht 5' 4\" (1.626 m) Wt 93.4 kg (206 lb) SpO2 99% Breastfeeding? Unknown BMI 35.36 kg/m² Patient states pain well controlled. No problems to report. Nothing needed. No sedation or nausea noted. No complications, adequate analgesia. Continue current postop pain regimen.

## 2018-11-17 NOTE — PROGRESS NOTES
1900 Received care of pt, call bell in reach, no changes in assessment bonding w/infant, encouraged po fluids, family @ bedside, tolerated cl liq diet w/o NV, no excessive bleeding 2300 BEDSIDE_VERBAL_RECORDED_WRITTEN: shift change report given to Tu Desai (oncoming nurse) by matthew Nguyen (offgoing nurse). Report given with SBAR, Kardex and MAR.

## 2018-11-17 NOTE — PROGRESS NOTES
Bedside and Verbal shift change report given to Enrique Ahn RN  (oncoming nurse) by JAZ Jacobs RN (offgoing nurse). Report included the following information SBAR, Kardex, Procedure Summary, Intake/Output, MAR, Accordion, Recent Results and Med Rec Status.

## 2018-11-18 PROCEDURE — 74011250637 HC RX REV CODE- 250/637: Performed by: OBSTETRICS & GYNECOLOGY

## 2018-11-18 PROCEDURE — 74011250636 HC RX REV CODE- 250/636: Performed by: OBSTETRICS & GYNECOLOGY

## 2018-11-18 PROCEDURE — 65270000029 HC RM PRIVATE

## 2018-11-18 RX ADMIN — SIMETHICONE CHEW TAB 80 MG 80 MG: 80 TABLET ORAL at 08:24

## 2018-11-18 RX ADMIN — KETOROLAC TROMETHAMINE 30 MG: 30 INJECTION, SOLUTION INTRAMUSCULAR at 16:04

## 2018-11-18 RX ADMIN — LEVOTHYROXINE SODIUM 125 MCG: 125 TABLET ORAL at 21:48

## 2018-11-18 RX ADMIN — KETOROLAC TROMETHAMINE 30 MG: 30 INJECTION, SOLUTION INTRAMUSCULAR at 08:13

## 2018-11-18 NOTE — PROGRESS NOTES
Bedside and Verbal shift change report given to CARLOTA Travis RN (oncoming nurse) by JAZ Jacobs RN (offgoing nurse). Report included the following information SBAR, Kardex, Procedure Summary, Intake/Output, MAR, Accordion and Recent Results.

## 2018-11-18 NOTE — PROGRESS NOTES
Progress Note Patient: Cary Rosado MRN: [id number]  SSN: [id number] YOB: 1985  Age: 35 y.o. Sex: female ROOM:  240/01 Subjective:  
 
Postpartum Day: 2 Delivery:  delivery The patient feels well. The patient denies emotional concerns. The baby is well. Baby is feeding via bottle. The patient is ambulating well. The patient  tolerating a normal diet. Flatus has been passed. Objective:  
  
Patient Vitals for the past 24 hrs: 
 BP Temp Pulse Resp SpO2  
18 2336 127/77 98.2 °F (36.8 °C) (!) 102 18 97 % 18 1530 126/79 97.7 °F (36.5 °C) 97 18 99 % Lochia:  appropriate Uterine Fundus:   firm Fundus Location:  -3 Incision:  no significant drainage, no dehiscence, no significant erythema DVT Evaluation:  No evidence of DVT seen on physical exam. 
Negative Shahram's sign. No cords or calf tenderness. No significant calf/ankle edema. Lab/Data Review: All lab results for the last 24 hours reviewed. LABS: Recent Results (from the past 48 hour(s)) HEMOGLOBIN Collection Time: 18  3:08 AM  
Result Value Ref Range HGB 8.8 (L) 12.0 - 16.0 g/dL HEMATOCRIT Collection Time: 18  3:08 AM  
Result Value Ref Range HCT 26.7 (L) 35.0 - 45.0 % Assessment:  
 
Status post: Doing well postpartum  delivery Plan:  
 
Postpartum care discussed including diet, ambulation, and actvitiy restrictions. Discussed circumcision: Benefits are that it may be easier to keep clean and various ethnic and Synagogue customs. Risks are bleeding most commonly, which is resolved with pressure or hemostatic gels or sponges. Scarring and infection are possible but extremely unlikely. It may not be be possible to remove all that we would like to remove if the shaft is short, as this would be more likely to lead to scarring. Wants circ done. Discharge instructions and questions answered.  
 
  
Signed By: Orion King MD   
 November 18, 2018

## 2018-11-19 VITALS
WEIGHT: 206 LBS | BODY MASS INDEX: 35.17 KG/M2 | RESPIRATION RATE: 18 BRPM | OXYGEN SATURATION: 100 % | DIASTOLIC BLOOD PRESSURE: 86 MMHG | HEIGHT: 64 IN | HEART RATE: 83 BPM | TEMPERATURE: 98.5 F | SYSTOLIC BLOOD PRESSURE: 145 MMHG

## 2018-11-19 LAB
APPEARANCE UR: CLEAR
BILIRUB UR QL: NEGATIVE
COLOR UR: YELLOW
GLUCOSE UR QL STRIP.AUTO: NEGATIVE MG/DL
KETONES UR-MCNC: ABNORMAL MG/DL
LEUKOCYTE ESTERASE UR QL STRIP: ABNORMAL
NITRITE UR QL: NEGATIVE
PH UR: 7.5 [PH] (ref 5–9)
PROT UR QL: 30 MG/DL
RBC # UR STRIP: ABNORMAL /UL
SERVICE CMNT-IMP: ABNORMAL
SP GR UR: 1.01 (ref 1–1.02)
UROBILINOGEN UR QL: 0.2 EU/DL (ref 0.2–1)

## 2018-11-19 PROCEDURE — 74011250636 HC RX REV CODE- 250/636

## 2018-11-19 PROCEDURE — 74011000250 HC RX REV CODE- 250

## 2018-11-19 PROCEDURE — 74011250637 HC RX REV CODE- 250/637: Performed by: OBSTETRICS & GYNECOLOGY

## 2018-11-19 RX ADMIN — OXYCODONE HYDROCHLORIDE AND ACETAMINOPHEN 2 TABLET: 5; 325 TABLET ORAL at 01:11

## 2018-11-19 NOTE — PROGRESS NOTES
Progress Note Patient: Vick Solano MRN: [id number]  SSN: [id number] YOB: 1985  Age: 35 y.o. Sex: female ROOM:  240/01 Subjective:  
 
Postpartum Day: 3 Delivery:  delivery The patient feels well. The patient denies emotional concerns. The baby is well. Baby is feeding via breast.  The patient is ambulating well. The patient  tolerating a normal diet. Flatus has been passed. Objective:  
  
Patient Vitals for the past 24 hrs: 
 BP Temp Pulse Resp SpO2  
18 0745 145/86 98.5 °F (36.9 °C) 83 18 100 % 18 2150 139/81 98.2 °F (36.8 °C) 95 18 99 % 18 1655 127/75 98.3 °F (36.8 °C) 86 17 100 % 18 0800 133/70 97.6 °F (36.4 °C) 94 16 99 % Lochia:  appropriate Uterine Fundus:   firm Fundus Location:  -3 Incision:  no significant drainage, no dehiscence, no significant erythema DVT Evaluation:  No evidence of DVT seen on physical exam. 
Negative Shahram's sign. No cords or calf tenderness. No significant calf/ankle edema. Lab/Data Review: All lab results for the last 24 hours reviewed. LABS: No results found for this or any previous visit (from the past 48 hour(s)). Assessment:  
 
Status post: Doing well postpartum  delivery Plan:  
 
Postpartum care discussed including diet, ambulation, and actvitiy restrictions. Discharge instructions and questions answered. Signed By: Aydee Estrada MD   
 2018

## 2018-11-19 NOTE — PROGRESS NOTES
Bedside and Verbal shift change report given to K. Caryle Churches and CHEKO Schultz (oncoming nurse) by Megan Biggs, MAT (offgoing nurse). Report included the following information SBAR, Kardex, Intake/Output, MAR and Recent Results. 2030- Assessment completed. Patient with some discomfort but denies medication at this time. Updated on plan of care for patient and infant. Instructed on s/s to report, no needs at this time. Patient and  verbalize understanding. 0715- Bedside and Verbal shift change report given to KAYLAH Kunz, MAT (oncoming nurse) by [facility]. Meme Guajardo RN (offgoing nurse). Report included the following information SBAR, Kardex, Intake/Output, MAR and Recent Results.

## 2018-11-19 NOTE — PROGRESS NOTES
Bedside and Verbal shift change report received by this RN (oncoming nurse) by GERARDO Peraza (offgoing nurse). Report included the following information SBAR, Kardex, Intake/Output, MAR and Recent Results. Patient resting comfortably in bed, infant in basinet and significant other at bedside. Patient has no needs at this time, will continue to monitor frequently.

## 2018-11-19 NOTE — PROGRESS NOTES
Patient discharged via wheelchair per protocol. Patient in stable condition. Discharge education reviewed and packet given to patient. Patient verbalizes understanding or d/c instructions. E-sign completed. Armbands removed and given to patient. No further needs or questions at this time.

## 2018-11-19 NOTE — DISCHARGE INSTRUCTIONS
POST DELIVERY DISCHARGE INSTRUCTIONS    Name: Bebe Calles  YOB: 1985  Primary Diagnosis: Active Problems:    IUP (intrauterine pregnancy), incidental (11/15/2018)      38 weeks gestation of pregnancy (11/15/2018)      Premature rupture of membranes (PROM), onset of labor within 24 hours, antepartum, , third trimester (11/15/2018)      Gestational diabetes mellitus (GDM) in childbirth, diet controlled (11/15/2018)      Prolonged rupture of membranes, greater than 24 hours, delivered, current hospitalization (2018)      Non-reassuring fetal heart rate or rhythm affecting management of mother (2018)        General:     Diet/Diet Restrictions:  Eight 8-ounce glasses of fluid daily (water, juices); avoid excessive caffeine intake. Meals/snacks as desired which are high in fiber and carbohydrates and low in fat and cholesterol. Physical Activity / Restrictions / Safety:     Avoid heavy lifting, no more that 8 lbs. For 2-3 weeks; limit use of stairs to 2 times daily for the first week home. No driving for one week. Avoid intercourse 4-6 weeks, no douching or tampon use. Check with obstetrician before starting or resuming an exercise program.         Discharge Instructions/Special Treatment/Home Care Needs:     Continue prenatal vitamins. Continue to use squirt bottle with warm water on your episiotomy after each bathroom use until bleeding stops. If steri-strips applied to your incision, remove in 7-10 days. Call your doctor for the following:     Fever over 101 degrees by mouth. Vaginal bleeding heavier than a normal menstrual period or clot larger than a golf ball. Red streaks or increased swelling of legs, painful red streaks on your breast.  Painful urination, constipation and increased pain or swelling or discharge with your incision. If you feel extremely anxious or overwhelmed. If you have thoughts of harming yourself and/or your baby.     Pain Management:     Pain Management:   Take Acetaminophen (Tylenol) or Ibuprofen (Advil, Motrin), as directed for pain. Use a warm Sitz bath 3 times daily to relieve episiotomy or hemorrhoidal discomfort. Heating pad to  incision as needed. For hemorrhoidal discomfort, use Tucks and Anusol cream as needed and directed. Follow-Up Care: These are general instructions for a healthy lifestyle:    No smoking/ No tobacco products/ Avoid exposure to second hand smoke    Surgeon General's Warning:  Quitting smoking now greatly reduces serious risk to your health. Obesity, smoking, and sedentary lifestyle greatly increases your risk for illness    A healthy diet, regular physical exercise & weight monitoring are important for maintaining a healthy lifestyle    Recognize signs and symptoms of STROKE:    F-face looks uneven    A-arms unable to move or move unevenly    S-speech slurred or non-existent    T-time-call 911 as soon as signs and symptoms begin-DO NOT go       Back to bed or wait to see if you get better-TIME IS BRAIN. Patient armband removed and given to patient to take home.   Patient was informed of the privacy risks if armband lost or stolen        Signed By: Breanna Oglesby RN                                                                                                   Date: 2018 Time: 9:25 AM

## 2018-11-30 NOTE — DISCHARGE SUMMARY
Discharge Summary    Admit date: 11/15/2018  Discharge date:  2018    Name: Nadine Conrad Record Number: [id number]   YOB: 1985    Postoperative Diagnosis:     Procedure: Low Cervical Transverse Procedure(s):   SECTION      Significant admission findings ( see H&P):  PROM at term, gest diabetes    Hospital course: Patient was admitted, progressed to 9 cm and failed to progress further. Fetal intolerance to pitocin, with NRFHTs. Would not progress beyond 9cm. Emergency C Section was performed. Baby weight and Apgars are shown below. Procedure was uneventful, with no obvious Complications. Postop course was uneventful, with no significant fever, and vitals normal, except as mentioned below. She mobilized well, didbreast-feed. She was discharged home on the third day post partum. She was asked to see us in the office in 1 weeks. She had good support at home, and will call if she has any problems before she sees us in the office. Incision looked good prior to discharge with no sign of hematoma, swelling, or infection.      Findings:     Anesthesia: Epidural    Birth Information:   Information for the patient's :  Tristian Son  April [[id number]]          Pelvic findings at surgery: normal    Estimated Blood Loss: 600    Relevant Lab:     ABO/Rh(D)   Date Value Ref Range Status   11/15/2018 O POSITIVE  Final     ABO,Rh   Date Value Ref Range Status   2018 O positive  Final     WBC   Date Value Ref Range Status   11/15/2018 10.7 4.6 - 13.2 K/uL Final     HGB   Date Value Ref Range Status   2018 8.8 (L) 12.0 - 16.0 g/dL Final   11/15/2018 11.9 (L) 12.0 - 16.0 g/dL Final     HCT   Date Value Ref Range Status   2018 26.7 (L) 35.0 - 45.0 % Final   11/15/2018 36.7 35.0 - 45.0 % Final     PLATELET   Date Value Ref Range Status   11/15/2018 261 135 - 420 K/uL Final       Lab Results   Component Value Date/Time Rubella, External immune 04/11/2018    GrBStrep, External negative 10/26/2018          Medication List      CONTINUE taking these medications    Iron 325 mg (65 mg iron) tablet  Generic drug:  ferrous sulfate     levothyroxine 125 mcg tablet  Commonly known as:  SYNTHROID     PNV66-Iron Fumarate-FA-DSS-DHA 26-1.2- mg Cap     VITAMIN D3 2,000 unit Tab  Generic drug:  cholecalciferol (vitamin D3)             Signed: Desirae Long MD      November 30, 2018
